# Patient Record
Sex: FEMALE
[De-identification: names, ages, dates, MRNs, and addresses within clinical notes are randomized per-mention and may not be internally consistent; named-entity substitution may affect disease eponyms.]

---

## 2022-05-23 ENCOUNTER — APPOINTMENT (OUTPATIENT)
Dept: OBGYN | Facility: CLINIC | Age: 42
End: 2022-05-23
Payer: COMMERCIAL

## 2022-05-23 VITALS
WEIGHT: 204 LBS | BODY MASS INDEX: 34.83 KG/M2 | SYSTOLIC BLOOD PRESSURE: 124 MMHG | DIASTOLIC BLOOD PRESSURE: 80 MMHG | HEIGHT: 64 IN

## 2022-05-23 DIAGNOSIS — Z80.0 FAMILY HISTORY OF MALIGNANT NEOPLASM OF DIGESTIVE ORGANS: ICD-10-CM

## 2022-05-23 DIAGNOSIS — Z86.39 PERSONAL HISTORY OF OTHER ENDOCRINE, NUTRITIONAL AND METABOLIC DISEASE: ICD-10-CM

## 2022-05-23 DIAGNOSIS — Z78.9 OTHER SPECIFIED HEALTH STATUS: ICD-10-CM

## 2022-05-23 DIAGNOSIS — Z01.419 ENCOUNTER FOR GYNECOLOGICAL EXAMINATION (GENERAL) (ROUTINE) W/OUT ABNORMAL FINDINGS: ICD-10-CM

## 2022-05-23 DIAGNOSIS — Z80.3 FAMILY HISTORY OF MALIGNANT NEOPLASM OF BREAST: ICD-10-CM

## 2022-05-23 PROBLEM — Z00.00 ENCOUNTER FOR PREVENTIVE HEALTH EXAMINATION: Status: ACTIVE | Noted: 2022-05-23

## 2022-05-23 LAB
BILIRUB UR QL STRIP: NORMAL
GLUCOSE UR-MCNC: NORMAL
HCG UR QL: 0.2 EU/DL
HGB UR QL STRIP.AUTO: ABNORMAL
KETONES UR-MCNC: NORMAL
LEUKOCYTE ESTERASE UR QL STRIP: NORMAL
NITRITE UR QL STRIP: NORMAL
PH UR STRIP: 6
PROT UR STRIP-MCNC: NORMAL
SP GR UR STRIP: 1.02

## 2022-05-23 PROCEDURE — 81003 URINALYSIS AUTO W/O SCOPE: CPT | Mod: QW

## 2022-05-23 PROCEDURE — 99386 PREV VISIT NEW AGE 40-64: CPT

## 2022-05-23 RX ORDER — ELECTROLYTES/DEXTROSE
SOLUTION, ORAL ORAL
Refills: 0 | Status: ACTIVE | COMMUNITY
Start: 2022-05-23

## 2022-05-23 NOTE — PHYSICAL EXAM
[Appropriately responsive] : appropriately responsive [Alert] : alert [No Acute Distress] : no acute distress [FreeTextEntry6] : Normal [Examination Of The Breasts] : a normal appearance [No Masses] : no breast masses were palpable [Labia Majora] : normal [Labia Minora] : normal [Normal] : normal [Uterine Adnexae] : normal [Declined] : Patient declined rectal exam [FreeTextEntry1] : mild irritation b/l inner thighs

## 2022-05-23 NOTE — PLAN
[FreeTextEntry1] : Patient present for well woman visit.\par \par Pap done\par Plan for Mammo/Breast US\par \par

## 2022-05-23 NOTE — HISTORY OF PRESENT ILLNESS
[Patient reported mammogram was normal] : Patient reported mammogram was normal [Patient reported PAP Smear was normal] : Patient reported PAP Smear was normal [N] : Patient reports normal menses [Currently Active] : currently active [Men] : men [No] : No [TextBox_4] : CHRISTIAN LEE  is 42 year female present for well woman visit. Patient has been complaining of Back pain due to breast size.\par \par Otherwise no complaints.  [Mammogramdate] : 05/21 [TextBox_19] : Scheduled June 7th  [PapSmeardate] : 05/21 [LMPDate] : 05/19/22 [MensesFreq] : 26 [MensesLength] : 3 [MensesAmount] : Medium [PGHxTotal] : 2 [[facility] : [facility] [FreeTextEntry1] :  x2 Uncomplicated pregnancies [Patient refuses STI testing] : Patient refuses STI testing

## 2022-05-23 NOTE — HISTORY OF PRESENT ILLNESS
[Patient reported mammogram was normal] : Patient reported mammogram was normal [Patient reported PAP Smear was normal] : Patient reported PAP Smear was normal [N] : Patient reports normal menses [Currently Active] : currently active [Men] : men [No] : No [TextBox_4] : CHRISTIAN LEE  is 42 year female present for well woman visit. Patient has been complaining of Back pain due to breast size.\par \par Otherwise no complaints.  [Mammogramdate] : 05/21 [TextBox_19] : Scheduled June 7th  [PapSmeardate] : 05/21 [LMPDate] : 05/19/22 [MensesFreq] : 26 [MensesLength] : 3 [MensesAmount] : Medium [PGHxTotal] : 2 [[facility] [FreeTextEntry1] :  x2 Uncomplicated pregnancies [Patient refuses STI testing] : Patient refuses STI testing

## 2022-05-26 LAB — CYTOLOGY CVX/VAG DOC THIN PREP: NORMAL

## 2022-06-29 ENCOUNTER — NON-APPOINTMENT (OUTPATIENT)
Age: 42
End: 2022-06-29

## 2022-07-01 ENCOUNTER — NON-APPOINTMENT (OUTPATIENT)
Age: 42
End: 2022-07-01

## 2022-07-13 ENCOUNTER — NON-APPOINTMENT (OUTPATIENT)
Age: 42
End: 2022-07-13

## 2022-11-07 ENCOUNTER — NON-APPOINTMENT (OUTPATIENT)
Age: 42
End: 2022-11-07

## 2022-12-05 ENCOUNTER — TRANSCRIPTION ENCOUNTER (OUTPATIENT)
Age: 42
End: 2022-12-05

## 2022-12-05 ENCOUNTER — APPOINTMENT (OUTPATIENT)
Dept: MRI IMAGING | Facility: CLINIC | Age: 42
End: 2022-12-05

## 2022-12-05 PROCEDURE — 77049 MRI BREAST C-+ W/CAD BI: CPT

## 2022-12-05 PROCEDURE — A9585: CPT

## 2022-12-07 ENCOUNTER — NON-APPOINTMENT (OUTPATIENT)
Age: 42
End: 2022-12-07

## 2022-12-21 ENCOUNTER — NON-APPOINTMENT (OUTPATIENT)
Age: 42
End: 2022-12-21

## 2024-12-25 ENCOUNTER — EMERGENCY (EMERGENCY)
Facility: HOSPITAL | Age: 44
LOS: 0 days | Discharge: HOME | End: 2024-12-25
Attending: EMERGENCY MEDICINE
Payer: COMMERCIAL

## 2024-12-25 VITALS
OXYGEN SATURATION: 100 % | SYSTOLIC BLOOD PRESSURE: 137 MMHG | RESPIRATION RATE: 18 BRPM | TEMPERATURE: 98 F | HEART RATE: 98 BPM | DIASTOLIC BLOOD PRESSURE: 88 MMHG

## 2024-12-25 VITALS
WEIGHT: 184.97 LBS | SYSTOLIC BLOOD PRESSURE: 115 MMHG | DIASTOLIC BLOOD PRESSURE: 77 MMHG | HEART RATE: 86 BPM | RESPIRATION RATE: 16 BRPM | OXYGEN SATURATION: 95 % | HEIGHT: 64 IN | TEMPERATURE: 98 F

## 2024-12-25 DIAGNOSIS — M48.061 SPINAL STENOSIS, LUMBAR REGION WITHOUT NEUROGENIC CLAUDICATION: ICD-10-CM

## 2024-12-25 DIAGNOSIS — R42 DIZZINESS AND GIDDINESS: ICD-10-CM

## 2024-12-25 DIAGNOSIS — M54.16 RADICULOPATHY, LUMBAR REGION: ICD-10-CM

## 2024-12-25 DIAGNOSIS — M51.369 OTHER INTERVERTEBRAL DISC DEGENERATION, LUMBAR REGION WITHOUT MENTION OF LUMBAR BACK PAIN OR LOWER EXTREMITY PAIN: ICD-10-CM

## 2024-12-25 DIAGNOSIS — M51.26 OTHER INTERVERTEBRAL DISC DISPLACEMENT, LUMBAR REGION: ICD-10-CM

## 2024-12-25 DIAGNOSIS — M54.41 LUMBAGO WITH SCIATICA, RIGHT SIDE: ICD-10-CM

## 2024-12-25 DIAGNOSIS — M54.50 LOW BACK PAIN, UNSPECIFIED: ICD-10-CM

## 2024-12-25 LAB
ANION GAP SERPL CALC-SCNC: 9 MMOL/L — SIGNIFICANT CHANGE UP (ref 7–14)
APPEARANCE UR: CLEAR — SIGNIFICANT CHANGE UP
BACTERIA # UR AUTO: NEGATIVE /HPF — SIGNIFICANT CHANGE UP
BASOPHILS # BLD AUTO: 0.04 K/UL — SIGNIFICANT CHANGE UP (ref 0–0.2)
BASOPHILS NFR BLD AUTO: 0.5 % — SIGNIFICANT CHANGE UP (ref 0–1)
BILIRUB UR-MCNC: NEGATIVE — SIGNIFICANT CHANGE UP
BUN SERPL-MCNC: 13 MG/DL — SIGNIFICANT CHANGE UP (ref 10–20)
CALCIUM SERPL-MCNC: 8.8 MG/DL — SIGNIFICANT CHANGE UP (ref 8.4–10.5)
CAST: 0 /LPF — SIGNIFICANT CHANGE UP (ref 0–4)
CHLORIDE SERPL-SCNC: 105 MMOL/L — SIGNIFICANT CHANGE UP (ref 98–110)
CO2 SERPL-SCNC: 22 MMOL/L — SIGNIFICANT CHANGE UP (ref 17–32)
COLOR SPEC: YELLOW — SIGNIFICANT CHANGE UP
CREAT SERPL-MCNC: 0.9 MG/DL — SIGNIFICANT CHANGE UP (ref 0.7–1.5)
DIFF PNL FLD: NEGATIVE — SIGNIFICANT CHANGE UP
EGFR: 81 ML/MIN/1.73M2 — SIGNIFICANT CHANGE UP
EOSINOPHIL # BLD AUTO: 0.04 K/UL — SIGNIFICANT CHANGE UP (ref 0–0.7)
EOSINOPHIL NFR BLD AUTO: 0.5 % — SIGNIFICANT CHANGE UP (ref 0–8)
GLUCOSE SERPL-MCNC: 92 MG/DL — SIGNIFICANT CHANGE UP (ref 70–99)
GLUCOSE UR QL: NEGATIVE MG/DL — SIGNIFICANT CHANGE UP
HCG SERPL QL: NEGATIVE — SIGNIFICANT CHANGE UP
HCT VFR BLD CALC: 37.6 % — SIGNIFICANT CHANGE UP (ref 37–47)
HGB BLD-MCNC: 12.5 G/DL — SIGNIFICANT CHANGE UP (ref 12–16)
IMM GRANULOCYTES NFR BLD AUTO: 0.3 % — SIGNIFICANT CHANGE UP (ref 0.1–0.3)
KETONES UR-MCNC: NEGATIVE MG/DL — SIGNIFICANT CHANGE UP
LEUKOCYTE ESTERASE UR-ACNC: ABNORMAL
LYMPHOCYTES # BLD AUTO: 1.52 K/UL — SIGNIFICANT CHANGE UP (ref 1.2–3.4)
LYMPHOCYTES # BLD AUTO: 20.6 % — SIGNIFICANT CHANGE UP (ref 20.5–51.1)
MCHC RBC-ENTMCNC: 31.1 PG — HIGH (ref 27–31)
MCHC RBC-ENTMCNC: 33.2 G/DL — SIGNIFICANT CHANGE UP (ref 32–37)
MCV RBC AUTO: 93.5 FL — SIGNIFICANT CHANGE UP (ref 81–99)
MONOCYTES # BLD AUTO: 0.43 K/UL — SIGNIFICANT CHANGE UP (ref 0.1–0.6)
MONOCYTES NFR BLD AUTO: 5.8 % — SIGNIFICANT CHANGE UP (ref 1.7–9.3)
NEUTROPHILS # BLD AUTO: 5.33 K/UL — SIGNIFICANT CHANGE UP (ref 1.4–6.5)
NEUTROPHILS NFR BLD AUTO: 72.3 % — SIGNIFICANT CHANGE UP (ref 42.2–75.2)
NITRITE UR-MCNC: NEGATIVE — SIGNIFICANT CHANGE UP
NRBC # BLD: 0 /100 WBCS — SIGNIFICANT CHANGE UP (ref 0–0)
PH UR: 7 — SIGNIFICANT CHANGE UP (ref 5–8)
PLATELET # BLD AUTO: 230 K/UL — SIGNIFICANT CHANGE UP (ref 130–400)
PMV BLD: 9.5 FL — SIGNIFICANT CHANGE UP (ref 7.4–10.4)
POTASSIUM SERPL-MCNC: 4.5 MMOL/L — SIGNIFICANT CHANGE UP (ref 3.5–5)
POTASSIUM SERPL-SCNC: 4.5 MMOL/L — SIGNIFICANT CHANGE UP (ref 3.5–5)
PROT UR-MCNC: NEGATIVE MG/DL — SIGNIFICANT CHANGE UP
RBC # BLD: 4.02 M/UL — LOW (ref 4.2–5.4)
RBC # FLD: 12.4 % — SIGNIFICANT CHANGE UP (ref 11.5–14.5)
RBC CASTS # UR COMP ASSIST: 0 /HPF — SIGNIFICANT CHANGE UP (ref 0–4)
SODIUM SERPL-SCNC: 136 MMOL/L — SIGNIFICANT CHANGE UP (ref 135–146)
SP GR SPEC: 1.01 — SIGNIFICANT CHANGE UP (ref 1–1.03)
SQUAMOUS # UR AUTO: 2 /HPF — SIGNIFICANT CHANGE UP (ref 0–5)
UROBILINOGEN FLD QL: 0.2 MG/DL — SIGNIFICANT CHANGE UP (ref 0.2–1)
WBC # BLD: 7.38 K/UL — SIGNIFICANT CHANGE UP (ref 4.8–10.8)
WBC # FLD AUTO: 7.38 K/UL — SIGNIFICANT CHANGE UP (ref 4.8–10.8)
WBC UR QL: 1 /HPF — SIGNIFICANT CHANGE UP (ref 0–5)

## 2024-12-25 PROCEDURE — 99285 EMERGENCY DEPT VISIT HI MDM: CPT

## 2024-12-25 PROCEDURE — 93010 ELECTROCARDIOGRAM REPORT: CPT

## 2024-12-25 PROCEDURE — 84703 CHORIONIC GONADOTROPIN ASSAY: CPT

## 2024-12-25 PROCEDURE — 93005 ELECTROCARDIOGRAM TRACING: CPT

## 2024-12-25 PROCEDURE — 72148 MRI LUMBAR SPINE W/O DYE: CPT | Mod: 26,MC

## 2024-12-25 PROCEDURE — 81001 URINALYSIS AUTO W/SCOPE: CPT

## 2024-12-25 PROCEDURE — 80048 BASIC METABOLIC PNL TOTAL CA: CPT

## 2024-12-25 PROCEDURE — 36415 COLL VENOUS BLD VENIPUNCTURE: CPT

## 2024-12-25 PROCEDURE — 96374 THER/PROPH/DIAG INJ IV PUSH: CPT | Mod: XU

## 2024-12-25 PROCEDURE — 20552 NJX 1/MLT TRIGGER POINT 1/2: CPT

## 2024-12-25 PROCEDURE — 85025 COMPLETE CBC W/AUTO DIFF WBC: CPT

## 2024-12-25 PROCEDURE — 96375 TX/PRO/DX INJ NEW DRUG ADDON: CPT | Mod: XU

## 2024-12-25 PROCEDURE — 72148 MRI LUMBAR SPINE W/O DYE: CPT | Mod: MC

## 2024-12-25 PROCEDURE — 99285 EMERGENCY DEPT VISIT HI MDM: CPT | Mod: 25

## 2024-12-25 RX ORDER — HYDROMORPHONE HYDROCHLORIDE 2 MG/1
0.5 TABLET ORAL ONCE
Refills: 0 | Status: DISCONTINUED | OUTPATIENT
Start: 2024-12-25 | End: 2024-12-25

## 2024-12-25 RX ORDER — KETOROLAC TROMETHAMINE 30 MG/ML
15 INJECTION INTRAMUSCULAR; INTRAVENOUS ONCE
Refills: 0 | Status: DISCONTINUED | OUTPATIENT
Start: 2024-12-25 | End: 2024-12-25

## 2024-12-25 RX ORDER — DEXAMETHASONE 1.5 MG/1
10 TABLET ORAL ONCE
Refills: 0 | Status: COMPLETED | OUTPATIENT
Start: 2024-12-25 | End: 2024-12-25

## 2024-12-25 RX ORDER — SODIUM CHLORIDE 9 MG/ML
1000 INJECTION, SOLUTION INTRAMUSCULAR; INTRAVENOUS; SUBCUTANEOUS ONCE
Refills: 0 | Status: COMPLETED | OUTPATIENT
Start: 2024-12-25 | End: 2024-12-25

## 2024-12-25 RX ORDER — LIDOCAINE 40 MG/G
1 CREAM TOPICAL ONCE
Refills: 0 | Status: COMPLETED | OUTPATIENT
Start: 2024-12-25 | End: 2024-12-25

## 2024-12-25 RX ORDER — DIAZEPAM 10 MG/1
5 TABLET ORAL ONCE
Refills: 0 | Status: DISCONTINUED | OUTPATIENT
Start: 2024-12-25 | End: 2024-12-25

## 2024-12-25 RX ORDER — OXYCODONE AND ACETAMINOPHEN 5; 325 MG/1; MG/1
1 TABLET ORAL
Qty: 9 | Refills: 0
Start: 2024-12-25 | End: 2024-12-27

## 2024-12-25 RX ORDER — METHYLPREDNISOLONE SOD SUCC 125 MG
1 VIAL (EA) INJECTION
Qty: 1 | Refills: 0
Start: 2024-12-25 | End: 2024-12-30

## 2024-12-25 RX ADMIN — KETOROLAC TROMETHAMINE 15 MILLIGRAM(S): 30 INJECTION INTRAMUSCULAR; INTRAVENOUS at 08:04

## 2024-12-25 RX ADMIN — Medication 4 MILLIGRAM(S): at 06:24

## 2024-12-25 RX ADMIN — HYDROMORPHONE HYDROCHLORIDE 0.5 MILLIGRAM(S): 2 TABLET ORAL at 08:45

## 2024-12-25 RX ADMIN — LIDOCAINE 1 PATCH: 40 CREAM TOPICAL at 06:23

## 2024-12-25 RX ADMIN — SODIUM CHLORIDE 1000 MILLILITER(S): 9 INJECTION, SOLUTION INTRAMUSCULAR; INTRAVENOUS; SUBCUTANEOUS at 06:26

## 2024-12-25 RX ADMIN — DIAZEPAM 5 MILLIGRAM(S): 10 TABLET ORAL at 08:04

## 2024-12-25 RX ADMIN — DEXAMETHASONE 10 MILLIGRAM(S): 1.5 TABLET ORAL at 12:21

## 2024-12-25 NOTE — ED PROCEDURE NOTE - GENERAL PROCEDURE DETAILS
Right paraspinal lumbar area identified muscle with maximal point tenderness.  Infiltrated with approximately 5-7 cc of lidocaine 2%.  No blood loss.  Patient tolerated procedure well

## 2024-12-25 NOTE — ED PROVIDER NOTE - CLINICAL SUMMARY MEDICAL DECISION MAKING FREE TEXT BOX
Musculoskeletal pain.  Has if sciatic features.  With without neurological deficits.  Labs and EKG reviewed by me.  Patient medicated and effects were reassessed. Musculoskeletal pain.  Has if sciatic features.  With without neurological deficits.  Labs and EKG reviewed by me.  Patient medicated and effects were reassessed.      Patient reassessed.  Feels much better.  Seen by neurosurgery.  Recommend dose of Decadron in the emergency department.  And sent home on Medrol Dosepak as well as Percocet.  In addition patient requires outpatient physical therapy follow-up.  Patient lives on [address].  Will follow-up with both neurosurgeon and physical therapy on [address].  She is visiting here for [place].  Her prescription was sent to the pharmacy next to her father's house to be able to obtain pain meds and steroid.  Labs and imaging reviewed with pt. given good instructions when to return to ED and importance of f/u.  Pt verbalized understanding. patient was given opportunity to ask questions.

## 2024-12-25 NOTE — ED PROVIDER NOTE - PROGRESS NOTE DETAILS
Patient remained stable in ED and I signed out to Dr. VENEGAS at shift change pending reevaluation and dispo. Authored by Dr. Ortiz: Patient signed out to me at around 7:20 AM.  Patient is a 44-year-old female without significant past medical history been down and trying to  a bottle and felt pain in her right lower lumbar area.  Worse with movement.  This happened yesterday.  Got worse over last night.  Positional.  No associated nausea vomiting fever abdominal pain.  No urinary fecal incontinence.  Pain radiates to the right lower extremity posteriorly.  Able to ambulate and able to move without any weakness.  On exam, vital stable nontoxic abdomen soft lungs clear no midline C–T–L-spine tenderness, positive right lower lumbar area paraspinal tenderness at around L4-L5.  Equal reflexes bilaterally.  Knees.  Equal strength bilaterally.    Will add NSAIDs, benzodiazepine, local trigger point injection, reassess.  Labs sent earlier and EKG ordered earlier resulted in essentially unremarkable.  Urine is clear. Authored by Dr. Ortiz: Patient feels better.  MRI done.  Pending results. Authored by Dr. Ortiz: Neurosurgical consult Patient reassessed.  Feels much better.  Seen by neurosurgery.  Recommend dose of Decadron in the emergency department.  And sent home on Medrol Dosepak as well as Percocet.  In addition patient requires outpatient physical therapy follow-up.  Patient lives on [address].  Will follow-up with both neurosurgeon and physical therapy on [address].  She is visiting here for [place].  Her prescription was sent to the pharmacy next to her father's house to be able to obtain pain meds and steroid.  Labs and imaging reviewed with pt. given good instructions when to return to ED and importance of f/u.  Pt verbalized understanding. patient was given opportunity to ask questions.

## 2024-12-25 NOTE — ED PROVIDER NOTE - DIFFERENTIAL DIAGNOSIS
Electrolyte abnormalities, dehydration, GAB, hyperglycemia, hypoglycemia, symptomatic anemia.  r/o cardiac arrhythmia. Differential Diagnosis

## 2024-12-25 NOTE — CONSULT NOTE ADULT - PROBLEM SELECTOR RECOMMENDATION 9
1- Administer 10mg of Dexamethasone IV in er  2- Prescribe medrol dose pack upon DC  3- Prescribe Oxycodone 5mg Q6hr for 10 days upon discharge  4- Prescribe Valium 5mg 1tab PO Q 8hr PRN or Flexeril 10mg 1 tab Q8hr PRN for muscle spasms upon discharge  5- Provide referral for outpatient PT for L-spine  6- Provide referral for pain management for evaluation of right L4-L5 JEOVANY  7- Follow up with Dr Gann in 3-4 weeks  8- Communicated with Dr Gann

## 2024-12-25 NOTE — ED PROVIDER NOTE - CARE PROVIDER_API CALL
Lisa Gann  Neurosurgery  [address] [place] [address], [address]  [place] [address]  Phone: [phone]  Fax: [phone]  Follow Up Time: 7-10 Days

## 2024-12-25 NOTE — CONSULT NOTE ADULT - ASSESSMENT
45 y/o presented to [facility] with acute low back pain with right lumbar radiculopathy found to have right L4-L5 moderate right neural foraminal stenosis

## 2024-12-25 NOTE — ED PROVIDER NOTE - NSFOLLOWUPINSTRUCTIONS_ED_ALL_ED_FT
Back Pain    Back pain is very common in adults. The cause of back pain is rarely dangerous and the pain often gets better over time. The cause of your back pain may not be known and may include strain of muscles or ligaments, degeneration of the spinal disks, or arthritis. Occasionally the pain may radiate down your leg(s). Over-the-counter medicines to reduce pain and inflammation are often the most helpful. Stretching and remaining active frequently helps the healing process.     SEEK IMMEDIATE MEDICAL CARE IF YOU HAVE ANY OF THE FOLLOWING SYMPTOMS: bowel or bladder control problems, unusual weakness or numbness in your arms or legs, nausea or vomiting, abdominal pain, fever, dizziness/lightheadedness.      - follow up with Neurosurgery in [place].    – Follow-up with physical therapy [place] as discussed.    – Take your medications as directed.

## 2024-12-25 NOTE — CONSULT NOTE ADULT - SUBJECTIVE AND OBJECTIVE BOX
HISTORY OF PRESENT ILLNESS:   Patient stated that, yesterday morning she was standing and attempted to  the water bottle from standing position, did not bent over. Patient turned herself after picking up the water bottle, at that time, she suddenly started having severe pain in Rt lower back/buttock area, which continued since then and got worse over the time. Pain is moderate to severe, worse with movement. Patient had used OTC meds and heating pads. Patient pain continued and did not improve. Patient was standing today morning, and suddenly pain got worse and started feeling lightheaded, so had decided to come to ED. Patient lightheadedness had resolved. Patient denies cp/sob/abd pain. Denies trauma. Denies f/c/URI symptoms. Denies rash and denies risk factors for Infectious/traumatic vertebral pathology. Patient feeling her RLE is heavy, and has trouble moving it, but denies numbness. Denies any changes in bladder/bowel habits. Denies any other associated symptoms. Patient with h/o similar symptoms 15 yrs ago, after the epidural for her , and had no recurrence after that. Patient took two aleve and two tylenols prior to coming to ED and had not helped. Patient appears in significant pain and appears uncomfortable and has trouble to move on the stretcher for exam.  She reports that she started having symptoms when she lifted a bucket of water yesterday and then the pain became severe @ 3am. She denies any B/B dysfunction.    PAST MEDICAL & SURGICAL HISTORY:  No pertinent past medical history      No significant past surgical history        FAMILY HISTORY:      SOCIAL HISTORY:  Tobacco Use:  EtOH use:   Substance:    Allergies    No Known Allergies    Intolerances        REVIEW OF SYSTEMS  negative for any major pulmonary, GI, cardiac, and psychiatric history other than those listed above.    MEDICATIONS:  Antibiotics:    Neuro:    Anticoagulation:    OTHER:    IVF:      Vital Signs Last 24 Hrs  T(C): 36.6 (25 Dec 2024 08:08), Max: 36.6 (25 Dec 2024 05:22)  T(F): 97.9 (25 Dec 2024 08:08), Max: 97.9 (25 Dec 2024 05:22)  HR: 98 (25 Dec 2024 08:08) (86 - 98)  BP: 137/88 (25 Dec 2024 08:08) (115/77 - 137/88)  BP(mean): --  RR: 18 (25 Dec 2024 08:08) (16 - 18)  SpO2: 100% (25 Dec 2024 08:08) (95% - 100%)    Parameters below as of 25 Dec 2024 08:08  Patient On (Oxygen Delivery Method): room air        PHYSICAL EXAM:  A&Ox3, GCS 15  PERRL, EOMI, CN II-XII grossly intact  CV: RRR  Respiratory: equal rise and fall of shest  Abdomen: soft, NT/ND  Motor: 5/5 in b/l UE @ bicep, triceps, and deltoids.  5/5 in b/l LE @ hip/knee flex-ext,   5/5 dorsi/plantar flex-ext in b/l feet  Superficial sensation intact in b/l UE/LE    LABS:                        12.5   7.38  )-----------( 230      ( 25 Dec 2024 06:45 )             37.6         136  |  105  |  13  ----------------------------<  92  4.5   |  22  |  0.9    Ca    8.8      25 Dec 2024 06:45        Urinalysis Basic - ( 25 Dec 2024 07:59 )    Color: Yellow / Appearance: Clear / S.013 / pH: x  Gluc: x / Ketone: Negative mg/dL  / Bili: Negative / Urobili: 0.2 mg/dL   Blood: x / Protein: Negative mg/dL / Nitrite: Negative   Leuk Esterase: Trace / RBC: 0 /HPF / WBC 1 /HPF   Sq Epi: x / Non Sq Epi: 2 /HPF / Bacteria: Negative /HPF      CULTURES:      RADIOLOGY & ADDITIONAL STUDIES:  MRI L-spine wo con: L1-L2: No spinal canal or neuroforaminal stenosis.  L2-L3: No spinal canal or neuroforaminal stenosis.  L3-L4: No spinal canal stenosis. Small right foraminal disc protrusion and right facet hypertrophy are contributing to mild neuroforaminal stenosis.  L4-L5: Disc bulge with superimposed central disc herniation and bilateral facet hypertrophy result in moderate spinal canal stenosis with crowding of the cauda equina and mass effect on the descending L5 nerve roots bilaterally. Mild right and mild-to-moderate left neuroforaminal stenosis.  L5-S1: Disc desiccation with mild to moderate loss of disc space height. Type I endplate Modic changes. Small disc bulge without significant spinal canal stenosis. However, bilateral facet hypertrophy result in mild neuroforaminal stenosis bilaterally.    IMPRESSION:  Degenerative changes involving the lower lumbar spine. Please refer to findings related to L4-L5.    --- End of Report ---            YOKASTA HEAD MD; Attending Radiologist  This document has been electronically signed. Dec 25 2024 9:52AM

## 2024-12-25 NOTE — ED PROVIDER NOTE - PATIENT PORTAL LINK FT
You can access the FollowMyHealth Patient Portal offered by [facility] by registering at the following website: [email or web address]. By joining [employer]’s FollowMyHealth portal, you will also be able to view your health information using other applications (apps) compatible with our system.

## 2024-12-25 NOTE — ED PROVIDER NOTE - OBJECTIVE STATEMENT
Patient stated that, yesterday morning she was standing and attempted to  the water bottle from standing position, did not bent over. Patient turned herself after picking up the water bottle, at that time, she suddenly started having severe pain in Rt lower back/buttock area, which continued since then and got worse over the time. Pain is moderate to severe, worse with movement. Patient had used OTC meds and heating pads. Patient pain continued and did not improve. Patient was standing today morning, and suddenly pain got worse and started feeling lightheaded, so had decided to come to ED. Patient lightheadedness had resolved. Patient denies cp/sob/abd pain. Denies trauma. Denies f/c/URI symptoms. Denies rash and denies risk factors for Infectious/traumatic vertebral pathology. Patient feeling her RLE is heavy, and has trouble moving it, but denies numbness. Denies any changes in bladder/bowel habits. Denies any other associated symptoms. Patient with h/o similar symptoms 15 yrs ago, after the epidural for her , and had no recurrence after that. Patient took two aleve and two tylenols prior to coming to ED and had not helped. Patient appears in significant pain and appears uncomfortable and has trouble to move on the stretcher for exam.

## 2024-12-26 PROBLEM — Z78.9 OTHER SPECIFIED HEALTH STATUS: Chronic | Status: ACTIVE | Noted: 2024-12-25

## 2025-01-09 ENCOUNTER — APPOINTMENT (OUTPATIENT)
Dept: ORTHOPEDIC SURGERY | Facility: CLINIC | Age: 45
End: 2025-01-09

## 2025-01-22 ENCOUNTER — APPOINTMENT (OUTPATIENT)
Dept: NEUROSURGERY | Facility: CLINIC | Age: 45
End: 2025-01-22